# Patient Record
Sex: MALE | Race: BLACK OR AFRICAN AMERICAN | Employment: UNEMPLOYED | RURAL
[De-identification: names, ages, dates, MRNs, and addresses within clinical notes are randomized per-mention and may not be internally consistent; named-entity substitution may affect disease eponyms.]

---

## 2018-03-28 ENCOUNTER — OFFICE VISIT (OUTPATIENT)
Dept: FAMILY MEDICINE CLINIC | Age: 18
End: 2018-03-28

## 2018-03-28 VITALS
TEMPERATURE: 98.8 F | HEART RATE: 78 BPM | RESPIRATION RATE: 18 BRPM | SYSTOLIC BLOOD PRESSURE: 120 MMHG | OXYGEN SATURATION: 97 % | DIASTOLIC BLOOD PRESSURE: 73 MMHG | BODY MASS INDEX: 19.25 KG/M2 | WEIGHT: 127 LBS | HEIGHT: 68 IN

## 2018-03-28 DIAGNOSIS — J30.1 CHRONIC SEASONAL ALLERGIC RHINITIS DUE TO POLLEN: ICD-10-CM

## 2018-03-28 DIAGNOSIS — F90.0 ATTENTION DEFICIT HYPERACTIVITY DISORDER (ADHD), PREDOMINANTLY INATTENTIVE TYPE: ICD-10-CM

## 2018-03-28 DIAGNOSIS — H69.83 DYSFUNCTION OF BOTH EUSTACHIAN TUBES: ICD-10-CM

## 2018-03-28 DIAGNOSIS — K42.9 UMBILICAL HERNIA WITHOUT OBSTRUCTION AND WITHOUT GANGRENE: ICD-10-CM

## 2018-03-28 DIAGNOSIS — Z00.00 GENERAL MEDICAL EXAM: Primary | ICD-10-CM

## 2018-03-28 DIAGNOSIS — Z20.2 EXPOSURE TO STD: ICD-10-CM

## 2018-03-28 NOTE — PROGRESS NOTES
Cathy Mayer is a 21 y.o. male who presents with the following:  Chief Complaint   Patient presents with    Physical    Ear Fullness     left ear    Constipation       Physical   The history is provided by the patient (Patient with a history of ADHD comes in with stuffy ears from his allergies and a history of unprotected sex with someone he thinks may have had an STD.). Pertinent negatives include no chest pain, no abdominal pain, no headaches and no shortness of breath. Ear Fullness    Pertinent negatives include no headaches, no hearing loss, no sore throat, no abdominal pain, no cough and no rash. Constipation    Associated symptoms include constipation. Pertinent negatives include no abdominal pain, no dysuria, no chills and no fever. Allergies   Allergen Reactions    Fish Derived Nausea and Vomiting and Swelling    Pcn [Penicillins] Hives       Current Outpatient Prescriptions   Medication Sig    ibuprofen (MOTRIN) 600 mg tablet Take 1 Tab by mouth every six (6) hours as needed for Pain. No current facility-administered medications for this visit.         Past Medical History:   Diagnosis Date    Acne     ADHD (attention deficit hyperactivity disorder)     Blood type O+     Cerumen impaction     Dental caries     Eczema     Fever blister     Laceration of lip 1/28/2006    R upper lip    Otitis media        Past Surgical History:   Procedure Laterality Date    HX CIRCUMCISION         Family History   Problem Relation Age of Onset    Migraines Father     Migraines Brother     Hypertension Paternal Grandmother     Cancer Other      MGGM-LUNG    No Known Problems Mother        Social History     Social History    Marital status: SINGLE     Spouse name: N/A    Number of children: N/A    Years of education: N/A     Social History Main Topics    Smoking status: Never Smoker    Smokeless tobacco: Never Used    Alcohol use No    Drug use: No    Sexual activity: Not Currently Partners: Female     Other Topics Concern    None     Social History Narrative       Review of Systems   Constitutional: Negative for chills, fever, malaise/fatigue and weight loss. HENT: Negative for congestion, hearing loss, sore throat and tinnitus. Eyes: Negative for blurred vision, pain and discharge. Respiratory: Negative for cough, shortness of breath and wheezing. Cardiovascular: Negative for chest pain, palpitations, orthopnea, claudication and leg swelling. Gastrointestinal: Positive for constipation. Negative for abdominal pain and heartburn. Genitourinary: Negative for dysuria, frequency and urgency. Musculoskeletal: Negative for falls, joint pain and myalgias. Skin: Negative for itching and rash. Neurological: Negative for dizziness, tingling, tremors and headaches. Endo/Heme/Allergies: Negative for environmental allergies and polydipsia. Psychiatric/Behavioral: Negative for depression and substance abuse. The patient is not nervous/anxious. Visit Vitals    /73 (BP 1 Location: Left arm)    Pulse 78    Temp 98.8 °F (37.1 °C)    Resp 18    Ht 5' 8\" (1.727 m)    Wt 127 lb (57.6 kg)    SpO2 97%    BMI 19.31 kg/m2     Physical Exam   Constitutional: He is oriented to person, place, and time and well-developed, well-nourished, and in no distress. HENT:   Head: Normocephalic and atraumatic. Nose: Nose normal.   Mouth/Throat: Oropharynx is clear and moist.   Eyes: Conjunctivae and EOM are normal. Pupils are equal, round, and reactive to light. Neck: Normal range of motion. Neck supple. No JVD present. No tracheal deviation present. No thyromegaly present. Cardiovascular: Normal rate, regular rhythm, normal heart sounds and intact distal pulses. Exam reveals no gallop and no friction rub. No murmur heard. Pulmonary/Chest: Effort normal and breath sounds normal. No respiratory distress. He has no wheezes. He has no rales. He exhibits no tenderness. Abdominal: Soft. Bowel sounds are normal. He exhibits no distension and no mass. There is no tenderness. There is no rebound and no guarding. Small umbilical hernia   Genitourinary: Rectum normal and penis normal. Rectal exam shows guaiac negative stool. No discharge found. Genitourinary Comments: No hernia   Musculoskeletal: Normal range of motion. He exhibits no edema or tenderness. Lymphadenopathy:     He has no cervical adenopathy. Neurological: He is alert and oriented to person, place, and time. He has normal reflexes. No cranial nerve deficit. He exhibits normal muscle tone. Gait normal. Coordination normal.   Skin: Skin is warm and dry. No rash noted. No erythema. Psychiatric: Mood, memory, affect and judgment normal.   Vitals reviewed. ICD-10-CM ICD-9-CM    1. General medical exam Z00.00 V70.9 RPR      HIV 1/2 AG/AB, 4TH GENERATION,W RFLX CONFIRM      METABOLIC PANEL, COMPREHENSIVE      LIPID PANEL   2. Exposure to STD Z20.2 V01.6 RPR      HIV 1/2 AG/AB, 4TH GENERATION,W RFLX CONFIRM      METABOLIC PANEL, COMPREHENSIVE      LIPID PANEL      CHLAMYDIA/GC AMPLIFICATON THINPREP   3. Dysfunction of both eustachian tubes H69.83 381.81    4. Chronic seasonal allergic rhinitis due to pollen J30.1 477.0    5. Attention deficit hyperactivity disorder (ADHD), predominantly inattentive type F90.0 314.00    6.  Umbilical hernia without obstruction and without gangrene K42.9 553.1        Orders Placed This Encounter    RPR    HIV 1/2 AG/AB, 4TH GENERATION,W RFLX CONFIRM    METABOLIC PANEL, COMPREHENSIVE    LIPID PANEL    CHLAMYDIA/GC AMPLIFICATON THINPREP       Follow-up Disposition: Not on File    Dede Horowitz MD

## 2018-03-28 NOTE — MR AVS SNAPSHOT
[address] [place] [address] [phone] Patient: Tova Medley MRN: [id number] Visit Information Date & Time Provider Department Dept. Phone Encounter #  
 3/28/2018  8:00 AM Daljit Kenney MD [address] [place] [address] [id number] Upcoming Health Maintenance Date Due Hepatitis A Peds Age 1-18 (2 of 3 - Twinrix 3-Dose Series) 4/25/2018 DTaP/Tdap/Td series (7 - Td) 4/3/2019 Allergies as of 3/28/2018  Review Complete On: 3/28/2018 By: Daljit Kenney MD  
  
 Severity Noted Reaction Type Reactions Fish Derived High 11/10/2014    Nausea and Vomiting, Swelling Pcn [Penicillins]  09/09/2013    Hives Current Immunizations  Reviewed on 11/11/2013 Name Date DTaP 7/16/2002, 6/19/2000, 2/16/1999, 1/4/1999, 1998 HPV (Quad) 9/29/2014  9:22 AM, 2/24/2014  9:37 AM, 11/11/2013 Hep B Vaccine 1/4/1999, 1998, 1998 Hib 6/19/2000, 2/19/1999, 1/4/1999, 1998 Influenza Nasal Vaccine 11/10/2014, 2/24/2014  9:36 AM  
 Influenza Vaccine 10/13/2010, 2/3/2006 MMR 11/3/2003, 7/16/2002 Meningococcal ACWY Vaccine 5/12/2010 Poliovirus vaccine 7/16/2002, 1/4/1999, 1998 Tdap 4/3/2009 Varicella Virus Vaccine 7/26/2007, 6/19/2000 Not reviewed this visit You Were Diagnosed With   
  
 Codes Comments General medical exam    -  Primary ICD-10-CM: Z00.00 ICD-9-CM: V70.9 Exposure to STD     ICD-10-CM: Z20.2 ICD-9-CM: V01.6 Dysfunction of both eustachian tubes     ICD-10-CM: K27.22 ICD-9-CM: 381.81 Chronic seasonal allergic rhinitis due to pollen     ICD-10-CM: J30.1 ICD-9-CM: 477.0 Attention deficit hyperactivity disorder (ADHD), predominantly inattentive type     ICD-10-CM: F90.0 ICD-9-CM: 314.00 Umbilical hernia without obstruction and without gangrene     ICD-10-CM: K42.9 ICD-9-CM: 553.1 Vitals BP Pulse Temp Resp Height(growth percentile) Weight(growth percentile) 120/73 (48 %/ 36 %)* (BP 1 Location: Left arm) 78 98.8 °F (37.1 °C) 18 5' 8\" (1.727 m) (28 %, Z= -0.57) 127 lb (57.6 kg) (9 %, Z= -1.36) SpO2 BMI Smoking Status 97% 19.31 kg/m2 (7 %, Z= -1.49) Never Smoker *BP percentiles are based on NHBPEP's 4th Report Growth percentiles are based on CDC 2-20 Years data. Vitals History BMI and BSA Data Body Mass Index Body Surface Area  
 19.31 kg/m 2 1.66 m 2 Preferred Pharmacy Pharmacy Name Phone [address], [address] AT [facility]  [address] [facility] Afshin March [phone] Your Updated Medication List  
  
   
This list is accurate as of 3/28/18  9:08 AM.  Always use your most recent med list.  
  
  
  
  
 ibuprofen 600 mg tablet Commonly known as:  MOTRIN Take 1 Tab by mouth every six (6) hours as needed for Pain. We Performed the Following CHLAMYDIA/GC AMPLIFICATON THINPREP [[id number] Custom] HIV 1/2 AG/AB, 4TH GENERATION,W RFLX CONFIRM [id number] CPT(R)] LIPID PANEL [62545 CPT(R)] METABOLIC PANEL, COMPREHENSIVE [63936 CPT(R)] RPR [81171 CPT(R)] Introducing [facility]! [facility] introduces [employer] patient portal. Now you can access parts of your medical record, email your doctor's office, and request medication refills online. 1. In your internet browser, go to [email or web address] 2. Click on the First Time User? Click Here link in the Sign In box. You will see the New Member Sign Up page. 3. Enter your [employer] Access Code exactly as it appears below. You will not need to use this code after youve completed the sign-up process. If you do not sign up before the expiration date, you must request a new code. · [employer] Access Code: [facility] Expires: 6/26/2018  9:08 AM 
 
 4. Enter the last four digits of your Social Security Number (xxxx) and Date of Birth (mm/dd/yyyy) as indicated and click Submit. You will be taken to the next sign-up page. 5. Create a [employer] ID. This will be your [employer] login ID and cannot be changed, so think of one that is secure and easy to remember. 6. Create a [employer] password. You can change your password at any time. 7. Enter your Password Reset Question and Answer. This can be used at a later time if you forget your password. 8. Enter your e-mail address. You will receive e-mail notification when new information is available in [address] 9. Click Sign Up. You can now view and download portions of your medical record. 10. Click the Download Summary menu link to download a portable copy of your medical information. If you have questions, please visit the Frequently Asked Questions section of the [employer] website. Remember, [employer] is NOT to be used for urgent needs. For medical emergencies, dial 911. Now available from your iPhone and Android! Please provide this summary of care documentation to your next provider. Your primary care clinician is listed as Angelita Marion. If you have any questions after today's visit, please call [phone].

## 2018-03-29 LAB
ALBUMIN SERPL-MCNC: 4.7 G/DL (ref 3.5–5.5)
ALBUMIN/GLOB SERPL: 2 {RATIO} (ref 1.2–2.2)
ALP SERPL-CCNC: 97 IU/L (ref 39–117)
ALT SERPL-CCNC: 13 IU/L (ref 0–44)
AST SERPL-CCNC: 23 IU/L (ref 0–40)
BILIRUB SERPL-MCNC: 0.4 MG/DL (ref 0–1.2)
BUN SERPL-MCNC: 8 MG/DL (ref 6–20)
BUN/CREAT SERPL: 10 (ref 9–20)
CALCIUM SERPL-MCNC: 9.6 MG/DL (ref 8.7–10.2)
CHLORIDE SERPL-SCNC: 101 MMOL/L (ref 96–106)
CHOLEST SERPL-MCNC: 121 MG/DL (ref 100–169)
CO2 SERPL-SCNC: 26 MMOL/L (ref 18–29)
CREAT SERPL-MCNC: 0.78 MG/DL (ref 0.76–1.27)
GFR SERPLBLD CREATININE-BSD FMLA CKD-EPI: 131 ML/MIN/1.73
GFR SERPLBLD CREATININE-BSD FMLA CKD-EPI: 151 ML/MIN/1.73
GLOBULIN SER CALC-MCNC: 2.3 G/DL (ref 1.5–4.5)
GLUCOSE SERPL-MCNC: 99 MG/DL (ref 65–99)
HDLC SERPL-MCNC: 38 MG/DL
HIV 1+2 AB+HIV1 P24 AG SERPL QL IA: NON REACTIVE
INTERPRETATION, 910389: NORMAL
LDLC SERPL CALC-MCNC: 74 MG/DL (ref 0–109)
POTASSIUM SERPL-SCNC: 4.3 MMOL/L (ref 3.5–5.2)
PROT SERPL-MCNC: 7 G/DL (ref 6–8.5)
RPR SER QL: NON REACTIVE
SODIUM SERPL-SCNC: 142 MMOL/L (ref 134–144)
TRIGL SERPL-MCNC: 46 MG/DL (ref 0–89)
VLDLC SERPL CALC-MCNC: 9 MG/DL (ref 5–40)

## 2018-03-30 DIAGNOSIS — A74.9 CHLAMYDIA: Primary | ICD-10-CM

## 2018-03-30 LAB
C TRACH RRNA SPEC QL NAA+PROBE: POSITIVE
N GONORRHOEA RRNA SPEC QL NAA+PROBE: NEGATIVE

## 2018-03-30 RX ORDER — DOXYCYCLINE 100 MG/1
100 TABLET ORAL 2 TIMES DAILY
Qty: 20 TAB | Refills: 0 | Status: SHIPPED | OUTPATIENT
Start: 2018-03-30 | End: 2018-04-09